# Patient Record
Sex: MALE | Race: OTHER | HISPANIC OR LATINO
[De-identification: names, ages, dates, MRNs, and addresses within clinical notes are randomized per-mention and may not be internally consistent; named-entity substitution may affect disease eponyms.]

---

## 2019-11-15 ENCOUNTER — APPOINTMENT (OUTPATIENT)
Dept: CARDIOLOGY | Facility: CLINIC | Age: 79
End: 2019-11-15

## 2023-09-12 ENCOUNTER — NON-APPOINTMENT (OUTPATIENT)
Age: 83
End: 2023-09-12

## 2023-09-12 ENCOUNTER — APPOINTMENT (OUTPATIENT)
Dept: CARDIOLOGY | Facility: CLINIC | Age: 83
End: 2023-09-12
Payer: MEDICAID

## 2023-09-12 VITALS
DIASTOLIC BLOOD PRESSURE: 64 MMHG | TEMPERATURE: 98.3 F | OXYGEN SATURATION: 95 % | WEIGHT: 201 LBS | BODY MASS INDEX: 32.3 KG/M2 | HEART RATE: 71 BPM | SYSTOLIC BLOOD PRESSURE: 130 MMHG | HEIGHT: 66 IN

## 2023-09-12 VITALS — SYSTOLIC BLOOD PRESSURE: 117 MMHG | DIASTOLIC BLOOD PRESSURE: 70 MMHG

## 2023-09-12 DIAGNOSIS — Z82.49 FAMILY HISTORY OF ISCHEMIC HEART DISEASE AND OTHER DISEASES OF THE CIRCULATORY SYSTEM: ICD-10-CM

## 2023-09-12 DIAGNOSIS — Z78.9 OTHER SPECIFIED HEALTH STATUS: ICD-10-CM

## 2023-09-12 DIAGNOSIS — I73.9 PERIPHERAL VASCULAR DISEASE, UNSPECIFIED: ICD-10-CM

## 2023-09-12 PROCEDURE — 93000 ELECTROCARDIOGRAM COMPLETE: CPT

## 2023-09-12 PROCEDURE — 99205 OFFICE O/P NEW HI 60 MIN: CPT | Mod: 25

## 2023-10-27 ENCOUNTER — NON-APPOINTMENT (OUTPATIENT)
Age: 83
End: 2023-10-27

## 2023-10-31 ENCOUNTER — APPOINTMENT (OUTPATIENT)
Dept: CARDIOLOGY | Facility: CLINIC | Age: 83
End: 2023-10-31
Payer: MEDICAID

## 2023-10-31 PROCEDURE — 93306 TTE W/DOPPLER COMPLETE: CPT

## 2023-10-31 PROCEDURE — 93923 UPR/LXTR ART STDY 3+ LVLS: CPT

## 2023-11-01 ENCOUNTER — NON-APPOINTMENT (OUTPATIENT)
Age: 83
End: 2023-11-01

## 2023-11-06 ENCOUNTER — APPOINTMENT (OUTPATIENT)
Dept: CARDIOLOGY | Facility: CLINIC | Age: 83
End: 2023-11-06

## 2023-11-13 ENCOUNTER — APPOINTMENT (OUTPATIENT)
Dept: CARDIOLOGY | Facility: CLINIC | Age: 83
End: 2023-11-13
Payer: MEDICAID

## 2023-11-13 VITALS
WEIGHT: 200 LBS | SYSTOLIC BLOOD PRESSURE: 126 MMHG | BODY MASS INDEX: 32.14 KG/M2 | OXYGEN SATURATION: 98 % | HEIGHT: 66 IN | DIASTOLIC BLOOD PRESSURE: 78 MMHG | HEART RATE: 67 BPM

## 2023-11-13 PROCEDURE — 99214 OFFICE O/P EST MOD 30 MIN: CPT

## 2023-11-14 ENCOUNTER — APPOINTMENT (OUTPATIENT)
Dept: FAMILY MEDICINE | Facility: CLINIC | Age: 83
End: 2023-11-14
Payer: MEDICAID

## 2023-11-14 VITALS
BODY MASS INDEX: 33.11 KG/M2 | OXYGEN SATURATION: 98 % | SYSTOLIC BLOOD PRESSURE: 128 MMHG | RESPIRATION RATE: 15 BRPM | DIASTOLIC BLOOD PRESSURE: 84 MMHG | HEART RATE: 74 BPM | TEMPERATURE: 98.1 F | HEIGHT: 66 IN | WEIGHT: 206 LBS

## 2023-11-14 DIAGNOSIS — Z00.00 ENCOUNTER FOR GENERAL ADULT MEDICAL EXAMINATION W/OUT ABNORMAL FINDINGS: ICD-10-CM

## 2023-11-14 DIAGNOSIS — Z12.5 ENCOUNTER FOR SCREENING FOR MALIGNANT NEOPLASM OF PROSTATE: ICD-10-CM

## 2023-11-14 DIAGNOSIS — Z76.89 PERSONS ENCOUNTERING HEALTH SERVICES IN OTHER SPECIFIED CIRCUMSTANCES: ICD-10-CM

## 2023-11-14 PROCEDURE — G0008: CPT

## 2023-11-14 PROCEDURE — 99387 INIT PM E/M NEW PAT 65+ YRS: CPT | Mod: 25

## 2023-11-14 PROCEDURE — 90662 IIV NO PRSV INCREASED AG IM: CPT

## 2023-12-05 ENCOUNTER — NON-APPOINTMENT (OUTPATIENT)
Age: 83
End: 2023-12-05

## 2023-12-12 ENCOUNTER — APPOINTMENT (OUTPATIENT)
Dept: FAMILY MEDICINE | Facility: CLINIC | Age: 83
End: 2023-12-12
Payer: MEDICAID

## 2023-12-12 VITALS
RESPIRATION RATE: 14 BRPM | OXYGEN SATURATION: 97 % | TEMPERATURE: 98 F | SYSTOLIC BLOOD PRESSURE: 154 MMHG | DIASTOLIC BLOOD PRESSURE: 86 MMHG | HEIGHT: 66 IN | WEIGHT: 212 LBS | HEART RATE: 75 BPM | BODY MASS INDEX: 34.07 KG/M2

## 2023-12-12 DIAGNOSIS — Z23 ENCOUNTER FOR IMMUNIZATION: ICD-10-CM

## 2023-12-12 PROCEDURE — 90677 PCV20 VACCINE IM: CPT

## 2023-12-12 PROCEDURE — 99213 OFFICE O/P EST LOW 20 MIN: CPT | Mod: 25

## 2023-12-12 PROCEDURE — G0009: CPT

## 2023-12-12 RX ORDER — LEVOCETIRIZINE DIHYDROCHLORIDE 5 MG/1
5 TABLET ORAL DAILY
Qty: 30 | Refills: 1 | Status: ACTIVE | COMMUNITY
Start: 2023-12-12 | End: 1900-01-01

## 2023-12-18 NOTE — HISTORY OF PRESENT ILLNESS
[FreeTextEntry1] : allergies [de-identified] : 82 y/o male with PMHx significant for Cardiomyopathy, HTN, HLD, Claudication presenting to the office for follow up. No new complains

## 2023-12-18 NOTE — HEALTH RISK ASSESSMENT
[Audit-CScore] : 0 [de-identified] : regular [de-identified] : walking [[id number]] : 0 [AdvancecareDate] : 11/23

## 2023-12-18 NOTE — ASSESSMENT
[FreeTextEntry1] : 82 y/o male with PMHx significant for Cardiomyopathy, HTN, HLD, Claudication presenting to the office c/o runny nose and for follow up.  ENT: Seasonal allergies -Star Levocetirizine, every evening, side effects discussed.  CVS: Cardiomyopathy, HTN, HLD -Blood pressure optimal, not taking any meds -Cardiology is Dr Lovell -TTE as above, EF 43% -VERNON PVR normal  HCM:  -Flu vaccine administered in house 11/23 -Blood work results reviewed -No colonoscopy secondary to age. -PCV 20 administered in house today

## 2024-01-08 ENCOUNTER — APPOINTMENT (OUTPATIENT)
Dept: CARDIOLOGY | Facility: CLINIC | Age: 84
End: 2024-01-08

## 2024-01-16 ENCOUNTER — APPOINTMENT (OUTPATIENT)
Dept: CARDIOLOGY | Facility: CLINIC | Age: 84
End: 2024-01-16

## 2024-01-22 ENCOUNTER — APPOINTMENT (OUTPATIENT)
Dept: FAMILY MEDICINE | Facility: CLINIC | Age: 84
End: 2024-01-22
Payer: MEDICAID

## 2024-01-22 VITALS
OXYGEN SATURATION: 95 % | RESPIRATION RATE: 14 BRPM | BODY MASS INDEX: 34.07 KG/M2 | HEIGHT: 66 IN | HEART RATE: 100 BPM | WEIGHT: 212 LBS

## 2024-01-22 DIAGNOSIS — Z23 ENCOUNTER FOR IMMUNIZATION: ICD-10-CM

## 2024-01-22 PROCEDURE — G2211 COMPLEX E/M VISIT ADD ON: CPT | Mod: NC,1L

## 2024-01-22 PROCEDURE — 99214 OFFICE O/P EST MOD 30 MIN: CPT

## 2024-01-30 PROBLEM — Z23 ENCOUNTER FOR IMMUNIZATION: Status: ACTIVE | Noted: 2024-01-30

## 2024-01-30 NOTE — ASSESSMENT
[FreeTextEntry1] : 82 y/o male with PMHx significant for Cardiomyopathy, HTN, HLD, Claudication presenting to the office c/o   ENT: Seasonal allergies -Star Famotidine every evening, side effects discussed. -Allergy / Immunology referral provided  CVS: Cardiomyopathy, HTN, HLD -Blood pressure optimal, not taking any meds -Cardiology is Dr Lovell -TTE as above, EF 43% -VERNON PVR normal  HCM:  -Flu vaccine administered in house 11/23 -No colonoscopy secondary to age. -PCV 20 administered 10/23

## 2024-01-30 NOTE — HEALTH RISK ASSESSMENT
[1 or 2 (0 pts)] : 1 or 2 (0 points) [Never (0 pts)] : Never (0 points) [No] : In the past 12 months have you used drugs other than those required for medical reasons? No [No falls in past year] : Patient reported no falls in the past year [0] : 1) Little interest or pleasure doing things: Not at all (0) [PHQ-2 Negative - No further assessment needed] : PHQ-2 Negative - No further assessment needed [Never] : Never [Patient/Caregiver unclear of wishes] : , patient/caregiver unclear of wishes [Audit-CScore] : 0 [de-identified] : walking [de-identified] : regular [[id number]] : 0 [AdvancecareDate] : 11/23

## 2024-01-30 NOTE — HISTORY OF PRESENT ILLNESS
[FreeTextEntry8] : 84 y/o male with PMHx significant for Cardiomyopathy, HTN, HLD, Claudication presenting to the office c/o itchiness throughout his body after showering, lasting about 30 minutes, it begins after drying off from shower, it seems to be relieved by running cold water. Pt had been prescribed anti-histamine but took it for one week and it did not help.

## 2024-02-28 ENCOUNTER — APPOINTMENT (OUTPATIENT)
Dept: FAMILY MEDICINE | Facility: CLINIC | Age: 84
End: 2024-02-28
Payer: MEDICAID

## 2024-02-28 VITALS
DIASTOLIC BLOOD PRESSURE: 80 MMHG | HEART RATE: 104 BPM | OXYGEN SATURATION: 95 % | HEIGHT: 66 IN | RESPIRATION RATE: 14 BRPM | WEIGHT: 210 LBS | BODY MASS INDEX: 33.75 KG/M2 | SYSTOLIC BLOOD PRESSURE: 130 MMHG

## 2024-02-28 DIAGNOSIS — E66.9 OBESITY, UNSPECIFIED: ICD-10-CM

## 2024-02-28 DIAGNOSIS — I10 ESSENTIAL (PRIMARY) HYPERTENSION: ICD-10-CM

## 2024-02-28 DIAGNOSIS — J30.2 OTHER SEASONAL ALLERGIC RHINITIS: ICD-10-CM

## 2024-02-28 DIAGNOSIS — L23.9 ALLERGIC CONTACT DERMATITIS, UNSPECIFIED CAUSE: ICD-10-CM

## 2024-02-28 DIAGNOSIS — E78.5 HYPERLIPIDEMIA, UNSPECIFIED: ICD-10-CM

## 2024-02-28 DIAGNOSIS — R42 DIZZINESS AND GIDDINESS: ICD-10-CM

## 2024-02-28 DIAGNOSIS — I42.9 CARDIOMYOPATHY, UNSPECIFIED: ICD-10-CM

## 2024-02-28 PROCEDURE — G2211 COMPLEX E/M VISIT ADD ON: CPT | Mod: NC,1L

## 2024-02-28 PROCEDURE — 99214 OFFICE O/P EST MOD 30 MIN: CPT

## 2024-02-28 RX ORDER — FAMOTIDINE 20 MG/1
20 TABLET, FILM COATED ORAL
Qty: 60 | Refills: 0 | Status: ACTIVE | COMMUNITY
Start: 2024-01-22 | End: 1900-01-01

## 2024-02-28 NOTE — ASSESSMENT
[FreeTextEntry1] : 82 y/o male with PMHx significant for Cardiomyopathy, HTN, HLD, Claudication presenting to the office c/o episode of dizziness after a heavy meal.  ENT: Dizziness -Self resolved episode, likely in setting of heavy meal -If further episodes RTO for further eval  ENT: Seasonal allergies -Continue Famotidine every evening, side effects discussed. -Allergy / Immunology referral provided, has not made appointment yet  CVS: Cardiomyopathy, HTN, HLD -Blood pressure optimal, not taking any meds -Cardiology is Dr Lovell -TTE as above, EF 43% -VERNON PVR normal -Start Atorvastatin for LDL of 165, e-prescribed  HCM:  -Flu vaccine administered in house 11/23 -No colonoscopy secondary to age. -PCV 20 administered 10/23

## 2024-02-28 NOTE — HISTORY OF PRESENT ILLNESS
[FreeTextEntry1] : Follow up [de-identified] : 82 y/o male with PMHx significant for Cardiomyopathy, HTN, HLD, Claudication presenting to the office c/o one episode of dizziness which lasted all day long in the morning after having a hefty meal. Pt denies any nausea, no diarrhea, no other associate symptoms.

## 2024-02-28 NOTE — HEALTH RISK ASSESSMENT
[Never (0 pts)] : Never (0 points) [1 or 2 (0 pts)] : 1 or 2 (0 points) [No] : In the past 12 months have you used drugs other than those required for medical reasons? No [No falls in past year] : Patient reported no falls in the past year [0] : 2) Feeling down, depressed, or hopeless: Not at all (0) [PHQ-2 Negative - No further assessment needed] : PHQ-2 Negative - No further assessment needed [Patient/Caregiver unclear of wishes] : , patient/caregiver unclear of wishes [Never] : Never [Audit-CScore] : 0 [de-identified] : walking [de-identified] : regular [[id number]] : 0 [AdvancecareDate] : 11/23

## 2024-06-05 ENCOUNTER — RX RENEWAL (OUTPATIENT)
Age: 84
End: 2024-06-05

## 2024-06-05 RX ORDER — ATORVASTATIN CALCIUM 20 MG/1
20 TABLET, FILM COATED ORAL
Qty: 90 | Refills: 0 | Status: ACTIVE | COMMUNITY
Start: 2024-02-28 | End: 1900-01-01

## 2024-09-17 ENCOUNTER — APPOINTMENT (OUTPATIENT)
Dept: FAMILY MEDICINE | Facility: CLINIC | Age: 84
End: 2024-09-17
Payer: MEDICAID

## 2024-09-17 ENCOUNTER — NON-APPOINTMENT (OUTPATIENT)
Age: 84
End: 2024-09-17

## 2024-09-17 VITALS
HEART RATE: 96 BPM | RESPIRATION RATE: 15 BRPM | WEIGHT: 205 LBS | BODY MASS INDEX: 32.95 KG/M2 | HEIGHT: 66 IN | DIASTOLIC BLOOD PRESSURE: 82 MMHG | OXYGEN SATURATION: 98 % | SYSTOLIC BLOOD PRESSURE: 140 MMHG | TEMPERATURE: 97.7 F

## 2024-09-17 DIAGNOSIS — L23.9 ALLERGIC CONTACT DERMATITIS, UNSPECIFIED CAUSE: ICD-10-CM

## 2024-09-17 DIAGNOSIS — R42 DIZZINESS AND GIDDINESS: ICD-10-CM

## 2024-09-17 DIAGNOSIS — Z76.89 PERSONS ENCOUNTERING HEALTH SERVICES IN OTHER SPECIFIED CIRCUMSTANCES: ICD-10-CM

## 2024-09-17 DIAGNOSIS — I47.19 OTHER SUPRAVENTRICULAR TACHYCARDIA: ICD-10-CM

## 2024-09-17 DIAGNOSIS — I73.9 PERIPHERAL VASCULAR DISEASE, UNSPECIFIED: ICD-10-CM

## 2024-09-17 DIAGNOSIS — E78.5 HYPERLIPIDEMIA, UNSPECIFIED: ICD-10-CM

## 2024-09-17 DIAGNOSIS — E66.9 OBESITY, UNSPECIFIED: ICD-10-CM

## 2024-09-17 DIAGNOSIS — I10 ESSENTIAL (PRIMARY) HYPERTENSION: ICD-10-CM

## 2024-09-17 DIAGNOSIS — I42.9 CARDIOMYOPATHY, UNSPECIFIED: ICD-10-CM

## 2024-09-17 DIAGNOSIS — E55.9 VITAMIN D DEFICIENCY, UNSPECIFIED: ICD-10-CM

## 2024-09-17 PROCEDURE — 99214 OFFICE O/P EST MOD 30 MIN: CPT

## 2024-09-17 PROCEDURE — G2211 COMPLEX E/M VISIT ADD ON: CPT | Mod: NC

## 2024-09-25 PROBLEM — Z76.89 ENCOUNTER TO ESTABLISH CARE: Status: RESOLVED | Noted: 2023-11-14 | Resolved: 2024-09-25

## 2024-09-25 PROBLEM — R42 DIZZINESS, NONSPECIFIC: Status: RESOLVED | Noted: 2024-02-28 | Resolved: 2024-09-25

## 2024-09-25 PROBLEM — E55.9 VITAMIN D INSUFFICIENCY: Status: ACTIVE | Noted: 2024-09-17

## 2024-09-25 PROBLEM — L23.9 ALLERGIC DERMATITIS: Status: RESOLVED | Noted: 2024-01-22 | Resolved: 2024-09-25

## 2024-09-25 NOTE — HEALTH RISK ASSESSMENT
[1 or 2 (0 pts)] : 1 or 2 (0 points) [Never (0 pts)] : Never (0 points) [No] : In the past 12 months have you used drugs other than those required for medical reasons? No [No falls in past year] : Patient reported no falls in the past year [0] : 2) Feeling down, depressed, or hopeless: Not at all (0) [PHQ-2 Negative - No further assessment needed] : PHQ-2 Negative - No further assessment needed [Patient/Caregiver unclear of wishes] : , patient/caregiver unclear of wishes [Never] : Never [Audit-CScore] : 0 [de-identified] : walking [de-identified] : regular [[id number]] : 0 [AdvancecareDate] : 11/23

## 2024-09-25 NOTE — HISTORY OF PRESENT ILLNESS
[FreeTextEntry1] : Follow up [de-identified] : 82 y/o male with PMHx significant for Cardiomyopathy, HTN, HLD, Claudication presenting to the office for follow up and medication refills.

## 2024-09-25 NOTE — ASSESSMENT
[FreeTextEntry1] : 84 y/o male with PMHx significant for Cardiomyopathy, HTN, HLD, Claudication presenting to the office for follow up and medication refills.  CVS: Cardiomyopathy, HTN, HLD, Paroxysmal atrial tachycardia -Blood pressure optimal, not taking any meds -Cardiology is Dr Lovell, referral provided for follow up -TTE EF 43% -VERNON PVR normal -Continue Atorvastatin, e-refilled  ENT: Seasonal allergies -Continue Famotidine every evening, side effects discussed. -Allergy / Immunology referral provided, never made appointment  HCM:  -Flu vaccine 11/23 -No colonoscopy secondary to age. -PCV 20 administered 10/23

## 2024-09-25 NOTE — HEALTH RISK ASSESSMENT
[1 or 2 (0 pts)] : 1 or 2 (0 points) [Never (0 pts)] : Never (0 points) [No] : In the past 12 months have you used drugs other than those required for medical reasons? No [No falls in past year] : Patient reported no falls in the past year [0] : 2) Feeling down, depressed, or hopeless: Not at all (0) [PHQ-2 Negative - No further assessment needed] : PHQ-2 Negative - No further assessment needed [Patient/Caregiver unclear of wishes] : , patient/caregiver unclear of wishes [Never] : Never [Audit-CScore] : 0 [de-identified] : walking [de-identified] : regular [[id number]] : 0 [AdvancecareDate] : 11/23

## 2024-09-25 NOTE — ASSESSMENT
[FreeTextEntry1] : 82 y/o male with PMHx significant for Cardiomyopathy, HTN, HLD, Claudication presenting to the office for follow up and medication refills.  CVS: Cardiomyopathy, HTN, HLD, Paroxysmal atrial tachycardia -Blood pressure optimal, not taking any meds -Cardiology is Dr Lovell, referral provided for follow up -TTE EF 43% -VERNON PVR normal -Continue Atorvastatin, e-refilled  ENT: Seasonal allergies -Continue Famotidine every evening, side effects discussed. -Allergy / Immunology referral provided, never made appointment  HCM:  -Flu vaccine 11/23 -No colonoscopy secondary to age. -PCV 20 administered 10/23

## 2024-09-25 NOTE — HISTORY OF PRESENT ILLNESS
[FreeTextEntry1] : Follow up [de-identified] : 84 y/o male with PMHx significant for Cardiomyopathy, HTN, HLD, Claudication presenting to the office for follow up and medication refills.

## 2024-10-29 ENCOUNTER — APPOINTMENT (OUTPATIENT)
Dept: CARDIOLOGY | Facility: CLINIC | Age: 84
End: 2024-10-29
Payer: MEDICAID

## 2024-10-29 ENCOUNTER — NON-APPOINTMENT (OUTPATIENT)
Age: 84
End: 2024-10-29

## 2024-10-29 VITALS
DIASTOLIC BLOOD PRESSURE: 76 MMHG | WEIGHT: 202 LBS | SYSTOLIC BLOOD PRESSURE: 129 MMHG | HEIGHT: 66 IN | HEART RATE: 96 BPM | BODY MASS INDEX: 32.47 KG/M2 | OXYGEN SATURATION: 98 %

## 2024-10-29 DIAGNOSIS — E55.9 VITAMIN D DEFICIENCY, UNSPECIFIED: ICD-10-CM

## 2024-10-29 DIAGNOSIS — I42.9 CARDIOMYOPATHY, UNSPECIFIED: ICD-10-CM

## 2024-10-29 DIAGNOSIS — E78.5 HYPERLIPIDEMIA, UNSPECIFIED: ICD-10-CM

## 2024-10-29 DIAGNOSIS — I73.9 PERIPHERAL VASCULAR DISEASE, UNSPECIFIED: ICD-10-CM

## 2024-10-29 DIAGNOSIS — I10 ESSENTIAL (PRIMARY) HYPERTENSION: ICD-10-CM

## 2024-10-29 DIAGNOSIS — I47.19 OTHER SUPRAVENTRICULAR TACHYCARDIA: ICD-10-CM

## 2024-10-29 PROCEDURE — G2211 COMPLEX E/M VISIT ADD ON: CPT | Mod: NC

## 2024-10-29 PROCEDURE — 93000 ELECTROCARDIOGRAM COMPLETE: CPT

## 2024-10-29 PROCEDURE — 99215 OFFICE O/P EST HI 40 MIN: CPT

## 2024-10-29 RX ORDER — VALSARTAN 40 MG/1
40 TABLET, COATED ORAL
Qty: 90 | Refills: 3 | Status: ACTIVE | COMMUNITY
Start: 2024-10-29 | End: 1900-01-01

## 2024-10-29 RX ORDER — DAPAGLIFLOZIN 5 MG/1
5 TABLET, FILM COATED ORAL DAILY
Qty: 60 | Refills: 2 | Status: ACTIVE | COMMUNITY
Start: 2024-10-29 | End: 1900-01-01

## 2024-10-29 RX ORDER — METOPROLOL SUCCINATE 25 MG/1
25 TABLET, EXTENDED RELEASE ORAL DAILY
Qty: 90 | Refills: 3 | Status: ACTIVE | COMMUNITY
Start: 2024-10-29 | End: 1900-01-01

## 2024-11-12 ENCOUNTER — APPOINTMENT (OUTPATIENT)
Dept: CARDIOLOGY | Facility: CLINIC | Age: 84
End: 2024-11-12
Payer: MEDICAID

## 2024-11-12 PROCEDURE — 93356 MYOCRD STRAIN IMG SPCKL TRCK: CPT

## 2024-11-12 PROCEDURE — 93306 TTE W/DOPPLER COMPLETE: CPT

## 2024-11-19 ENCOUNTER — APPOINTMENT (OUTPATIENT)
Dept: FAMILY MEDICINE | Facility: CLINIC | Age: 84
End: 2024-11-19

## 2024-11-20 ENCOUNTER — NON-APPOINTMENT (OUTPATIENT)
Age: 84
End: 2024-11-20

## 2025-04-30 ENCOUNTER — APPOINTMENT (OUTPATIENT)
Dept: CARDIOLOGY | Facility: CLINIC | Age: 85
End: 2025-04-30